# Patient Record
Sex: MALE | Race: WHITE | NOT HISPANIC OR LATINO | Employment: FULL TIME | URBAN - METROPOLITAN AREA
[De-identification: names, ages, dates, MRNs, and addresses within clinical notes are randomized per-mention and may not be internally consistent; named-entity substitution may affect disease eponyms.]

---

## 2023-09-26 PROBLEM — K21.9 ESOPHAGEAL REFLUX: Status: ACTIVE | Noted: 2023-09-26

## 2023-09-26 PROBLEM — M25.50 ARTHRALGIA: Status: ACTIVE | Noted: 2023-09-26

## 2023-09-26 PROBLEM — M79.10 MYALGIA: Status: ACTIVE | Noted: 2023-09-26

## 2023-09-26 PROBLEM — G40.909 SEIZURE DISORDER (MULTI): Status: ACTIVE | Noted: 2023-09-26

## 2023-09-26 PROBLEM — E78.5 HYPERLIPIDEMIA: Status: ACTIVE | Noted: 2023-09-26

## 2023-09-26 RX ORDER — DIAZEPAM 5 MG/1
5 TABLET ORAL AS NEEDED
COMMUNITY

## 2023-09-26 RX ORDER — AMLODIPINE BESYLATE 5 MG/1
1 TABLET ORAL DAILY
COMMUNITY

## 2023-09-26 RX ORDER — SIMVASTATIN 40 MG/1
1 TABLET, FILM COATED ORAL EVERY EVENING
COMMUNITY
Start: 2017-06-21

## 2023-09-26 RX ORDER — GABAPENTIN 100 MG/1
1 CAPSULE ORAL 3 TIMES DAILY
COMMUNITY
Start: 2019-12-05

## 2023-09-26 RX ORDER — PANTOPRAZOLE SODIUM 40 MG/1
1 TABLET, DELAYED RELEASE ORAL DAILY
COMMUNITY
Start: 2016-07-21

## 2023-09-26 RX ORDER — CYANOCOBALAMIN 1000 UG/ML
1000 INJECTION, SOLUTION INTRAMUSCULAR; SUBCUTANEOUS
COMMUNITY

## 2023-09-26 RX ORDER — ATORVASTATIN CALCIUM 40 MG/1
1 TABLET, FILM COATED ORAL DAILY
COMMUNITY

## 2023-09-26 RX ORDER — ERGOCALCIFEROL (VITAMIN D2) 50 MCG
50 CAPSULE ORAL DAILY
COMMUNITY

## 2023-09-26 RX ORDER — LEVETIRACETAM 750 MG/1
1 TABLET ORAL 2 TIMES DAILY
COMMUNITY

## 2023-09-27 PROBLEM — Z78.9 DIFFICULT INTRAVENOUS ACCESS: Status: ACTIVE | Noted: 2023-09-27

## 2023-09-27 RX ORDER — HEPARIN 100 UNIT/ML
500 SYRINGE INTRAVENOUS AS NEEDED
Status: CANCELLED | OUTPATIENT
Start: 2023-10-03

## 2023-10-03 ENCOUNTER — INFUSION (OUTPATIENT)
Dept: INFUSION THERAPY | Facility: CLINIC | Age: 68
End: 2023-10-03
Payer: MEDICARE

## 2023-10-03 VITALS
HEART RATE: 70 BPM | DIASTOLIC BLOOD PRESSURE: 105 MMHG | RESPIRATION RATE: 18 BRPM | SYSTOLIC BLOOD PRESSURE: 120 MMHG | OXYGEN SATURATION: 95 % | TEMPERATURE: 98 F

## 2023-10-03 DIAGNOSIS — Z78.9 DIFFICULT INTRAVENOUS ACCESS: ICD-10-CM

## 2023-10-03 RX ORDER — HEPARIN 100 UNIT/ML
500 SYRINGE INTRAVENOUS AS NEEDED
Status: CANCELLED | OUTPATIENT
Start: 2023-10-03

## 2023-10-03 RX ORDER — HEPARIN 100 UNIT/ML
500 SYRINGE INTRAVENOUS AS NEEDED
Status: DISCONTINUED | OUTPATIENT
Start: 2023-10-03 | End: 2023-10-03 | Stop reason: HOSPADM

## 2023-10-03 RX ADMIN — HEPARIN 500 UNITS: 100 SYRINGE at 13:20

## 2023-10-03 NOTE — PATIENT INSTRUCTIONS
Today you received:   For:     (Tell all doctors including dentists that you are taking this medication)    Go the emergency room or call 911 if:  - You have signs of allergic reaction:   o      Rash, hives, itching.   o      Swollen, blistered, peeling skin.   o      Swelling of face, lips, mouth, tongue or throat.   o      Tightness of chest, trouble breathing, swallowing or talking     Call your doctor:    - If IV / injection site gets red, warm, swollen, itchy or leaks fluid or pus.     (Leave dressing on your IV site for at least 2 hours and keep area clean and dry)    - If you get sick or have symptoms of infection or are not feeling well for any reason.    (Wash your hands often, stay away from people who are sick)    - If you have side effects from your medication that do not go away or are bothersome.     (Refer to the teaching your nurse gave you for side effects to call your doctor about)     Common side effects may include:  stuffy nose, headache, feeling tired, muscle aches, upset stomach    - Before receiving any vaccines.    Call [facility] at [phone] if:    - You get sick, have surgery or dental work and your doctor wants your visit rescheduled.    - You need to cancel and reschedule your visit for any reason. Call at least 2 days before your visit if you need to cancel.     - Your insurance changes before your next visit.    (We will need to get approval from your new insurance. This can take up to two weeks.)    [facility] is opened Monday thru Friday 8am-8pm, Saturday 9am-5pm. We are closed on major holidays.     Voice mail will take your call if the center is closed. If you leave a message please allow 24 hours for a call back during weekdays. If you leave a message on a weekend/holiday, we will call you back the next business day.           Note authored and patient cared for by: Yancy Quigley RN   Note/Encounter reviewed by: EDMOND Thompson, FNP-C. This  provider on site at time of patient infusion. Infusion staff to notify this provider of any questions, concerns, abnormals or issues during infusion. No issues reported. Pt. tolerated infusion without difficulty. Pt. not independently evaluated by this provider during today's encounter.

## 2023-10-03 NOTE — PROGRESS NOTES
[facility]   infusion Clinic Note   Date: October 3, 2023   Name: Ford Scherer  : 1955   MRN: [id number]         Reason for Visit: No chief complaint on file.      Accompanied by:Self   Visit Type:: Infusion   Diagnosis: No diagnosis found.    Allergies:   Allergies as of 10/03/2023 - never reviewed   Allergen Reaction Noted    Morphine Anaphylaxis 2023    Opioids - morphine analogues Anaphylaxis 2023    Tamsulosin Unknown 2023      Current Meds has a current medication list which includes the following prescription(s): amlodipine, atorvastatin, cyanocobalamin, diazepam, ergocalciferol, gabapentin, levetiracetam, pantoprazole, and simvastatin.        Vitals:There were no vitals filed for this visit.   Infusion Pre-procedure Checklist   Allergies reviewed: yes   Medications reviewed: yes   Contraindications to treatment:No   Previous reaction to current treatment:No   Current Health Issues: None   Pain: '    Is the pain different from normal: No   Is the pain tolerable: n/a   Is your Doctor aware: n/a   Contraindications based on patient history: No   Provider notified: Not applicable   Labs: None   Fall Risk Screening:      Review of Systems - Oncology   Negative for complaint: [] all other systems have been reviewed and are negative for complaint   Infusion Readiness:   Assessment Concerns Related to Infusion: No  Provider notified: n/a  Assess patient for the concerns below. Document provider notification as appropriate:  - Does not meet criteria to treat N/A  - Has an active or recent infection with/without current antibiotic use N/A  - Has recent/planned dental work N/A  - Has recent/planned surgeries N/A  - Has recently received or plans to receive vaccinations N/A  - Has treatment related toxicities N/A  - Is pregnant (unless noted otherwise) N/A    Initiated By: Yancy Quigley RN   Time: 12:59 PM     Ford Scherer had no medications administered during this  visit.

## 2023-11-03 ENCOUNTER — INFUSION (OUTPATIENT)
Dept: INFUSION THERAPY | Facility: CLINIC | Age: 68
End: 2023-11-03
Payer: MEDICARE

## 2023-11-03 VITALS
RESPIRATION RATE: 16 BRPM | OXYGEN SATURATION: 96 % | TEMPERATURE: 98 F | DIASTOLIC BLOOD PRESSURE: 86 MMHG | HEART RATE: 88 BPM | BODY MASS INDEX: 29.81 KG/M2 | WEIGHT: 195.99 LBS | SYSTOLIC BLOOD PRESSURE: 136 MMHG

## 2023-11-03 DIAGNOSIS — E53.8 B12 DEFICIENCY: ICD-10-CM

## 2023-11-03 DIAGNOSIS — Z78.9 DIFFICULT INTRAVENOUS ACCESS: Primary | ICD-10-CM

## 2023-11-03 LAB — VIT B12 SERPL-MCNC: 1473 PG/ML (ref 211–911)

## 2023-11-03 PROCEDURE — 96374 THER/PROPH/DIAG INJ IV PUSH: CPT | Performed by: REGISTERED NURSE

## 2023-11-03 PROCEDURE — 82607 VITAMIN B-12: CPT

## 2023-11-03 RX ORDER — HEPARIN 100 UNIT/ML
500 SYRINGE INTRAVENOUS AS NEEDED
Status: CANCELLED | OUTPATIENT
Start: 2023-11-03

## 2023-11-03 RX ORDER — HEPARIN 100 UNIT/ML
500 SYRINGE INTRAVENOUS AS NEEDED
Status: DISCONTINUED | OUTPATIENT
Start: 2023-11-03 | End: 2023-11-03 | Stop reason: HOSPADM

## 2023-11-03 RX ADMIN — HEPARIN 500 UNITS: 100 SYRINGE at 13:48

## 2023-11-03 NOTE — PATIENT INSTRUCTIONS
One month    Today you received:  Heparin 500u  For: port flush     (Tell all doctors including dentists that you are taking this medication)    Go the emergency room or call 911 if:  - You have signs of allergic reaction:   o      Rash, hives, itching.   o      Swollen, blistered, peeling skin.   o      Swelling of face, lips, mouth, tongue or throat.   o      Tightness of chest, trouble breathing, swallowing or talking     Call your doctor:    - If IV / injection site gets red, warm, swollen, itchy or leaks fluid or pus.     (Leave dressing on your IV site for at least 2 hours and keep area clean and dry)    - If you get sick or have symptoms of infection or are not feeling well for any reason.    (Wash your hands often, stay away from people who are sick)    - If you have side effects from your medication that do not go away or are bothersome.     (Refer to the teaching your nurse gave you for side effects to call your doctor about)     Common side effects may include:  stuffy nose, headache, feeling tired, muscle aches, upset stomach    - Before receiving any vaccines.    Call the [facility] at [phone] if:    - You get sick, have surgery or dental work and your doctor wants your visit rescheduled.    - You need to cancel and reschedule your visit for any reason. Call at least 2 days before your visit if you need to cancel.     - Your insurance changes before your next visit.    (We will need to get approval from your new insurance. This can take up to two weeks.)    [facility] is opened Monday thru Friday 8am-8pm, Saturday 9am-5pm. We are closed on major holidays.     Voice mail will take your call if the center is closed. If you leave a message please allow 24 hours for a call back during weekdays. If you leave a message on a weekend/holiday, we will call you back the next business day.

## 2023-11-03 NOTE — PROGRESS NOTES
[facility]   Infusion Clinic Note   Date: November 3, 2023   Name: Ford Scherer  : 1955   MRN: [id number]         Reason for Visit: OP Infusion (Port flush heparin/Also brought in lab orders)      Accompanied by:Self   Visit Type:: Other: port flush    Diagnosis: Difficult intravenous access    B12 deficiency    Allergies:   Allergies as of 2023 - Reviewed 10/03/2023   Allergen Reaction Noted    Morphine Anaphylaxis 2023    Opioids - morphine analogues Anaphylaxis 2023    Tamsulosin Unknown 2023      Current Meds has a current medication list which includes the following prescription(s): amlodipine, atorvastatin, cyanocobalamin, diazepam, ergocalciferol, gabapentin, levetiracetam, pantoprazole, and simvastatin, and the following Facility-Administered Medications: heparin flush.        Vitals:  Vitals:    23 1322   BP: 136/86   Pulse: 88   Resp: 16   Temp: 36.7 °C (98 °F)   SpO2: 96%   Weight: 88.9 kg (195 lb 15.8 oz)      Infusion Pre-procedure Checklist   Allergies reviewed: yes   Medications reviewed: yes   Contraindications to treatment:No   Previous reaction to current treatment:No   Current Health Issues: None   Pain: '    Is the pain different from normal: No   Is the pain tolerable: n/a   Is your Doctor aware: n/a   Contraindications based on patient history: No   Provider notified: Not applicable   Labs: Labs reviewed   Fall Risk Screening:      Review of Systems - Oncology   Negative for complaint: [] all other systems have been reviewed and are negative for complaint   Infusion Readiness:   Assessment Concerns Related to Infusion: No  Provider notified: n/a  Assess patient for the concerns below. Document provider notification as appropriate:  - Does not meet criteria to treat N/A  - Has an active or recent infection with/without current antibiotic use No  - Has recent/planned dental work No  - Has recent/planned surgeries No  - Has recently  received or plans to receive vaccinations No  - Has treatment related toxicities N/A  - Is pregnant (unless noted otherwise) N/A    PATIENT HERE FOR PORT FLUSH ONLY AND LAB DRAW FOR B12 LEVEL. PORT ACCESSED VIA STERILE TECHINIQUE WITH 1' NEEDLE, FLUSHED WITH 30ML SALINE, BLOOD RETURN ACHIEVED AFTER MULTIPLE FLUSHES AND POSITION CHANGES. LAB DRAWN, PORT FLUSHED AGAIN WITH SALINE FOLLOWED BY HEPARIN 500U. TOLERATED WELL. PORT DEACCESSED, COVERED WITH GAUZE AND TAPE. PATIENT dischargeD HOME.     Initiated By: DANYA Thompson   Time: 1:57 PM     We administered heparin flush.

## 2023-12-05 ENCOUNTER — INFUSION (OUTPATIENT)
Dept: INFUSION THERAPY | Facility: CLINIC | Age: 68
End: 2023-12-05
Payer: MEDICARE

## 2023-12-05 VITALS
RESPIRATION RATE: 16 BRPM | TEMPERATURE: 98.1 F | DIASTOLIC BLOOD PRESSURE: 82 MMHG | HEART RATE: 69 BPM | OXYGEN SATURATION: 96 % | SYSTOLIC BLOOD PRESSURE: 133 MMHG

## 2023-12-05 DIAGNOSIS — Z78.9 DIFFICULT INTRAVENOUS ACCESS: ICD-10-CM

## 2023-12-05 RX ORDER — HEPARIN 100 UNIT/ML
500 SYRINGE INTRAVENOUS AS NEEDED
Status: CANCELLED | OUTPATIENT
Start: 2023-12-05

## 2023-12-05 RX ORDER — HEPARIN 100 UNIT/ML
500 SYRINGE INTRAVENOUS AS NEEDED
Status: DISCONTINUED | OUTPATIENT
Start: 2023-12-05 | End: 2023-12-05 | Stop reason: HOSPADM

## 2023-12-05 RX ADMIN — HEPARIN 500 UNITS: 100 SYRINGE at 13:00

## 2023-12-05 ASSESSMENT — PAIN SCALES - GENERAL: PAINLEVEL: 0-NO PAIN

## 2023-12-05 NOTE — PATIENT INSTRUCTIONS
Today :We administered heparin flush.     For:   1. Difficult intravenous access         Your next appointment is due in:  1 MONTH       Please read the  Medication Guide that was given to you and reviewed during todays visit.     (Tell all doctors including dentists that you are taking this medication)     Go to the emergency room or call 911 if:  -You have signs of allergic reaction:   -Rash, hives, itching.   -Swollen, blistered, peeling skin.   -Swelling of face, lips, mouth, tongue or throat.   -Tightness of chest, trouble breathing, swallowing or talking     Call your doctor:  - If IV / injection site gets red, warm, swollen, itchy or leaks fluid or pus.     (Leave dressing on your IV site for at least 2 hours and keep area clean and dry  - If you get sick or have symptoms of infection or are not feeling well for any reason.    (Wash your hands often, stay away from people who are sick)  - If you have side effects from your medication that do not go away or are bothersome.     (Refer to the teaching your nurse gave you for side effects to call your doctor about)    - Common side effects may include:  stuffy nose, headache, feeling tired, muscle aches, upset stomach  - Before receiving any vaccines     - Call the [facility] at   If:  - You get sick, are on antibiotics, have had a recent vaccine, have surgery or dental work and your doctor wants your visit rescheduled.  - You need to cancel and reschedule your visit for any reason. Call at least 2 days before your visit if you need to cancel.   - Your insurance changes before your next visit.    (We will need to get approval from your new insurance. This can take up to two weeks.)     [facility] is opened Monday thru Friday. We are closed on weekends and holidays.   Voice mail will take your call if the center is closed. If you leave a message please allow 24 hours for a call back during weekdays. If you leave a message on a  weekend/holiday, we will call you back the next business day.

## 2023-12-05 NOTE — PROGRESS NOTES
[facility]   Infusion Clinic Note   Date: 2023   Name: Ford Scherer  : 1955   MRN: [id number]         Reason for Visit: Injections (PATIENT HERE FOR MONTHLY MEDIPORT FLUSH)      Accompanied by:Self   Visit Type:: MEDIPORT FLUSH   Diagnosis: Difficult intravenous access    Allergies:   Allergies as of 2023 - Reviewed 2023   Allergen Reaction Noted    Morphine Anaphylaxis 2023    Opioids - morphine analogues Anaphylaxis 2023    Tamsulosin Unknown 2023      Current [facility] has a current medication list which includes the following prescription(s): amlodipine, atorvastatin, cyanocobalamin, diazepam, ergocalciferol, gabapentin, levetiracetam, pantoprazole, and simvastatin, and the following Facility-Administered Medications: heparin flush.        Vitals:  Vitals:    23 1257   BP: 133/82   Pulse: 69   Resp: 16   Temp: 36.7 °C (98.1 °F)   SpO2: 96%      Infusion Pre-procedure Checklist   Allergies reviewed: yes   Medications reviewed: yes   Contraindications to treatment:No   Previous reaction to current treatment:No   Current Health Issues: None   Pain: 0-no pain [0]'    Is the pain different from normal: No   Is the pain tolerable: n/a   Is your Doctor aware: n/a   Contraindications based on patient history: No   Provider notified: Not applicable   Labs: Labs reviewed   Fall Risk Screening: Jorge A Fall Risk  History of Falling, Immediate or Within 3 Months: No  Secondary Diagnosis: No  Ambulatory Aid: Walks without aid/bedrest/nurse assist  Intravenous Therapy/Heparin Lock: No  Gait/Transferring: Normal/bedrest/immobile  Mental Status: Oriented to own ability  Jules Fall Risk Score: 0    Review of Systems - Oncology   Negative for complaint: [x] all other systems have been reviewed and are negative for complaint   Infusion Readiness:   Assessment Concerns Related to Infusion: No  Provider notified: n/a  Assess patient for the concerns below.  Document provider notification as appropriate:  - Does not meet criteria to treat No  - Has an active or recent infection with/without current antibiotic use No  - Has recent/planned dental work No  - Has recent/planned surgeries No  - Has recently received or plans to receive vaccinations No  - Has treatment related toxicities No  - Is pregnant (unless noted otherwise) N/A    Initiated By: Kerrie Decker RN   Time: 1:12 PM     We administered heparin flush.     Note authored and patient cared for by: Kerrie Decker RN    Note/Encounter reviewed by: EDMOND Thompson, FNP-C. This provider on site at time of patient infusion. Infusion staff to notify this provider of any questions, concerns, abnormals or issues during infusion. No issues reported. Pt. tolerated infusion without difficulty. Pt. not independently evaluated by this provider during today's encounter.

## 2024-01-04 ENCOUNTER — INFUSION (OUTPATIENT)
Dept: INFUSION THERAPY | Facility: CLINIC | Age: 69
End: 2024-01-04
Payer: MEDICARE

## 2024-01-04 VITALS
SYSTOLIC BLOOD PRESSURE: 149 MMHG | HEART RATE: 82 BPM | RESPIRATION RATE: 16 BRPM | DIASTOLIC BLOOD PRESSURE: 92 MMHG | OXYGEN SATURATION: 99 % | TEMPERATURE: 98.1 F

## 2024-01-04 DIAGNOSIS — Z78.9 DIFFICULT INTRAVENOUS ACCESS: ICD-10-CM

## 2024-01-04 PROCEDURE — 36593 DECLOT VASCULAR DEVICE: CPT | Performed by: REGISTERED NURSE

## 2024-01-04 PROCEDURE — 96523 IRRIG DRUG DELIVERY DEVICE: CPT | Performed by: REGISTERED NURSE

## 2024-01-04 RX ORDER — HEPARIN 100 UNIT/ML
500 SYRINGE INTRAVENOUS AS NEEDED
Status: CANCELLED | OUTPATIENT
Start: 2024-01-04

## 2024-01-04 RX ORDER — HEPARIN 100 UNIT/ML
500 SYRINGE INTRAVENOUS AS NEEDED
Status: DISCONTINUED | OUTPATIENT
Start: 2024-01-04 | End: 2024-01-04 | Stop reason: HOSPADM

## 2024-01-04 ASSESSMENT — PAIN SCALES - GENERAL: PAINLEVEL: 0-NO PAIN

## 2024-01-04 NOTE — PROGRESS NOTES
[facility]   Infusion Clinic Note   Date: 2024   Name: Ford Scherer  : 1955   MRN: [id number]         Reason for Visit: Injections (PATIENT HERE FOR MONTHLY MEDIPORT FLUSH)      Accompanied by:Self   Visit Type:: Other: MEDIPORT FLUSH   Diagnosis: Difficult intravenous access    Allergies:   Allergies as of 2024 - Reviewed 2024   Allergen Reaction Noted   • Morphine Anaphylaxis 2023   • Opioids - morphine analogues Anaphylaxis 2023   • Tamsulosin Unknown 2023      Current [facility] has a current medication list which includes the following prescription(s): amlodipine, atorvastatin, cyanocobalamin, diazepam, ergocalciferol, gabapentin, levetiracetam, pantoprazole, and simvastatin, and the following Facility-Administered Medications: alteplase and heparin flush.        Vitals:  Vitals:    24 1252   BP: (!) 149/92   Pulse: 82   Resp: 16   Temp: 36.7 °C (98.1 °F)   SpO2: 99%      Infusion Pre-procedure Checklist   Allergies reviewed: yes   Medications reviewed: yes   Contraindications to treatment:No   Previous reaction to current treatment:No   Current Health Issues: None and Recent Illness RECOVERING FROM UPPER RESPIRATORY ILLNESS   Pain: 0-no pain [0]'    Is the pain different from normal: No   Is the pain tolerable: n/a   Is your Doctor aware: n/a   Contraindications based on patient history: No   Provider notified: Not applicable   Labs: Labs reviewed   Fall Risk Screening: Jules Fall Risk  History of Falling, Immediate or Within 3 Months: No  Ambulatory Aid: Walks without aid/bedrest/nurse assist  Intravenous Therapy/Heparin Lock: No  Gait/Transferring: Normal/bedrest/immobile  Mental Status: Oriented to own ability    Review of Systems - Oncology   Negative for complaint: [x] all other systems have been reviewed and are negative for complaint   Infusion Readiness:   Assessment Concerns Related to Infusion: No  Provider notified: n/a  Assess  patient for the concerns below. Document provider notification as appropriate:  - Does not meet criteria to treat No  - Has an active or recent infection with/without current antibiotic use No  - Has recent/planned dental work No  - Has recent/planned surgeries No  - Has recently received or plans to receive vaccinations No  - Has treatment related toxicities No  - Is pregnant (unless noted otherwise) N/A    Initiated By: Kerrie Decker RN   Time: 1:17 PM     We administered alteplase.

## 2024-01-04 NOTE — PATIENT INSTRUCTIONS
Today :We administered alteplase.     For:   1. Difficult intravenous access         Your next appointment is due in:  1 MONTH        Please read the  Medication Guide that was given to you and reviewed during todays visit.     (Tell all doctors including dentists that you are taking this medication)     Go to the emergency room or call 911 if:  -You have signs of allergic reaction:   -Rash, hives, itching.   -Swollen, blistered, peeling skin.   -Swelling of face, lips, mouth, tongue or throat.   -Tightness of chest, trouble breathing, swallowing or talking     Call your doctor:  - If IV / injection site gets red, warm, swollen, itchy or leaks fluid or pus.     (Leave dressing on your IV site for at least 2 hours and keep area clean and dry  - If you get sick or have symptoms of infection or are not feeling well for any reason.    (Wash your hands often, stay away from people who are sick)  - If you have side effects from your medication that do not go away or are bothersome.     (Refer to the teaching your nurse gave you for side effects to call your doctor about)    - Common side effects may include:  stuffy nose, headache, feeling tired, muscle aches, upset stomach  - Before receiving any vaccines     - Call the [facility] at   If:  - You get sick, are on antibiotics, have had a recent vaccine, have surgery or dental work and your doctor wants your visit rescheduled.  - You need to cancel and reschedule your visit for any reason. Call at least 2 days before your visit if you need to cancel.   - Your insurance changes before your next visit.    (We will need to get approval from your new insurance. This can take up to two weeks.)     [facility] is opened Monday thru Friday. We are closed on weekends and holidays.   Voice mail will take your call if the center is closed. If you leave a message please allow 24 hours for a call back during weekdays. If you leave a message on a  weekend/holiday, we will call you back the next business day.

## 2024-02-02 ENCOUNTER — INFUSION (OUTPATIENT)
Dept: INFUSION THERAPY | Facility: CLINIC | Age: 69
End: 2024-02-02
Payer: MEDICARE

## 2024-02-02 VITALS
RESPIRATION RATE: 16 BRPM | HEART RATE: 80 BPM | OXYGEN SATURATION: 99 % | DIASTOLIC BLOOD PRESSURE: 79 MMHG | TEMPERATURE: 98 F | SYSTOLIC BLOOD PRESSURE: 123 MMHG

## 2024-02-02 DIAGNOSIS — Z78.9 DIFFICULT INTRAVENOUS ACCESS: ICD-10-CM

## 2024-02-02 PROCEDURE — 96523 IRRIG DRUG DELIVERY DEVICE: CPT | Performed by: REGISTERED NURSE

## 2024-02-02 RX ORDER — HEPARIN 100 UNIT/ML
500 SYRINGE INTRAVENOUS AS NEEDED
Status: DISCONTINUED | OUTPATIENT
Start: 2024-02-02 | End: 2024-02-02 | Stop reason: HOSPADM

## 2024-02-02 RX ORDER — HEPARIN 100 UNIT/ML
500 SYRINGE INTRAVENOUS AS NEEDED
OUTPATIENT
Start: 2024-02-02

## 2024-02-02 RX ADMIN — HEPARIN 500 UNITS: 100 SYRINGE at 13:10

## 2024-02-02 NOTE — PROGRESS NOTES
[facility]   infusion Clinic Note   Date: 2024   Name: Ford Scherer  : 1955   MRN: [id number]         Reason for Visit: Injections (Heparin medi-port flush)         Visit Type: OTHER Heparin port flush       Ordered By: DAYDAY Lazo      Accompanied by:Self      Diagnosis: No diagnosis found.       Allergies:   Allergies as of 2024 - Reviewed 2024   Allergen Reaction Noted    Morphine Anaphylaxis 2023    Opioids - morphine analogues Anaphylaxis 2023    Tamsulosin Unknown 2023         Current [facility] has a current medication list which includes the following prescription(s): amlodipine, atorvastatin, cyanocobalamin, diazepam, ergocalciferol, gabapentin, levetiracetam, pantoprazole, and simvastatin.       Vitals:   Vitals:    24 1255   BP: 123/79   Pulse: 80   Resp: 16   Temp: 36.7 °C (98 °F)   SpO2: 99%             Infusion Pre-procedure Checklist:   - Allergies reviewed: yes   - Medications reviewed: no       - Previous reaction to current treatment: no      Assess patient for the concerns below. Document provider notification as appropriate.  - Active or recent infection with/without current antibiotic use: no  - Recent or planned invasive dental work: no  - Recent or planned surgeries: no  - Recently received or plans to receive vaccinations: no  - Has treatment related toxicities: no  - Is pregnant:  n/a      Pain: 0   - Is the pain different from normal: n/a   - Is the pain tolerable: n/a   - Is your Doctor aware:  n/a      Labs: N/A         Fall Risk Screening: Jules Fall Risk  History of Falling, Immediate or Within 3 Months: No  Ambulatory Aid: Walks without aid/bedrest/nurse assist  Intravenous Therapy/Heparin Lock: Yes  Gait/Transferring: Normal/bedrest/immobile  Mental Status: Oriented to own ability       Review Of Systems:  Review of Systems   Constitutional:         Pt denies any medical changes since last visit          Infusion Readiness:   - Assessment Concerns Related to Infusion: No  - Provider notified: no      Document Below Only If Indicated:   New Patient Education:    N/A (returning patient for continuation of therapy. Ongoing education provided as needed.)        Treatment Conditions & Drug Specific Questions:    NOT APPLICABLE        Weight Based Drug Calculations:    WEIGHT BASED DRUGS: NOT APPLICABLE / FLAT DOSE          Initiated By: Maura Bailey RN   Time: 1:08 PM     Ford [place] Scherer had no medications administered during this visit.

## 2024-03-01 ENCOUNTER — APPOINTMENT (OUTPATIENT)
Dept: INFUSION THERAPY | Facility: CLINIC | Age: 69
End: 2024-03-01
Payer: MEDICARE